# Patient Record
Sex: FEMALE | Race: BLACK OR AFRICAN AMERICAN
[De-identification: names, ages, dates, MRNs, and addresses within clinical notes are randomized per-mention and may not be internally consistent; named-entity substitution may affect disease eponyms.]

---

## 2019-04-08 ENCOUNTER — HOSPITAL ENCOUNTER (EMERGENCY)
Dept: HOSPITAL 62 - ER | Age: 24
Discharge: HOME | End: 2019-04-08
Payer: COMMERCIAL

## 2019-04-08 VITALS — DIASTOLIC BLOOD PRESSURE: 79 MMHG | SYSTOLIC BLOOD PRESSURE: 131 MMHG

## 2019-04-08 DIAGNOSIS — Z91.040: ICD-10-CM

## 2019-04-08 DIAGNOSIS — B96.89: ICD-10-CM

## 2019-04-08 DIAGNOSIS — F17.200: ICD-10-CM

## 2019-04-08 DIAGNOSIS — N76.0: Primary | ICD-10-CM

## 2019-04-08 DIAGNOSIS — Z91.018: ICD-10-CM

## 2019-04-08 DIAGNOSIS — N39.0: ICD-10-CM

## 2019-04-08 LAB
APPEARANCE UR: (no result)
APTT PPP: YELLOW S
BACTERIA (WET MOUNT): (no result)
BILIRUB UR QL STRIP: NEGATIVE
CHLAM PCR: NOT DETECTED
EPITHELIALS (WET MOUNT): (no result)
GLUCOSE UR STRIP-MCNC: NEGATIVE MG/DL
GON PCR: NOT DETECTED
KETONES UR STRIP-MCNC: NEGATIVE MG/DL
NITRITE UR QL STRIP: NEGATIVE
PH UR STRIP: 8 [PH] (ref 5–9)
PROT UR STRIP-MCNC: 30 MG/DL
RBCS (WET MOUNT): (no result)
SP GR UR STRIP: 1.02
T.VAGINALIS (WET MOUNT): (no result)
UROBILINOGEN UR-MCNC: 2 MG/DL (ref ?–2)
WBCS (WET MOUNT): (no result)
YEAST (WET MOUNT): (no result)

## 2019-04-08 PROCEDURE — 99283 EMERGENCY DEPT VISIT LOW MDM: CPT

## 2019-04-08 PROCEDURE — 87210 SMEAR WET MOUNT SALINE/INK: CPT

## 2019-04-08 PROCEDURE — 87088 URINE BACTERIA CULTURE: CPT

## 2019-04-08 PROCEDURE — 87491 CHLMYD TRACH DNA AMP PROBE: CPT

## 2019-04-08 PROCEDURE — 87086 URINE CULTURE/COLONY COUNT: CPT

## 2019-04-08 PROCEDURE — 87186 SC STD MICRODIL/AGAR DIL: CPT

## 2019-04-08 PROCEDURE — 81001 URINALYSIS AUTO W/SCOPE: CPT

## 2019-04-08 PROCEDURE — 87591 N.GONORRHOEAE DNA AMP PROB: CPT

## 2019-04-08 NOTE — ER DOCUMENT REPORT
ED GI/





- General


Chief Complaint: Vaginal Itching


Stated Complaint: VAGINAL ITCH/DISCHARGE


Time Seen by Provider: 04/08/19 10:50


Mode of Arrival: Ambulatory


Information source: Patient


Notes: 





Patient is a 23-year-old female presenting with chief complaint of white itchy 

vaginal discharge.  Patient denies any other symptoms.





TRAVEL OUTSIDE OF THE [place] IN LAST 30 DAYS: No





- Related Data


Allergies/Adverse Reactions: 


                                        





latex Allergy (Verified 04/08/19 10:35)


   


pineapple Allergy (Verified 04/08/19 10:35)


   











Past Medical History





- General


Information source: Patient





- Social History


Smoking Status: Current Some Day Smoker


Chew tobacco use (# tins/day): No


Frequency of alcohol use: None


Drug Abuse: None


Family History: Reviewed & Not Pertinent


Patient has suicidal ideation: No


Patient has homicidal ideation: No





- Medical History


Medical History: Negative


Renal/ Medical History: Denies: Hx Peritoneal Dialysis


Surgical Hx: Negative





- Immunizations


Immunizations up to date: Yes





Review of Systems





- Review of Systems


Constitutional: No symptoms reported


EENT: No symptoms reported


Cardiovascular: No symptoms reported


Respiratory: No symptoms reported


Gastrointestinal: No symptoms reported


Genitourinary: No symptoms reported


Female Genitourinary: Vaginal discharge


Musculoskeletal: No symptoms reported


Skin: No symptoms reported


Hematologic/Lymphatic: No symptoms reported


Neurological/Psychological: No symptoms reported





Physical Exam





- Vital signs


Vitals: 


                                        











Temp Pulse Resp BP Pulse Ox


 


 98.4 F   79   16   128/69 H  100 


 


 04/08/19 10:39  04/08/19 10:39  04/08/19 10:39  04/08/19 10:39  04/08/19 10:39














- Notes


Notes: 





PHYSICAL EXAMINATION:





GENERAL: Well-appearing, well-nourished and in no acute distress.





HEAD: Atraumatic, normocephalic.





EYES: Pupils equal round and reactive to light, extraocular movements intact, 

conjunctiva are normal.





ENT: Nares patent, oropharynx clear without exudates.  Moist mucous membranes.





NECK: Normal range of motion, supple without lymphadenopathy





LUNGS: Breath sounds clear to auscultation bilaterally and equal.  No wheezes 

rales or rhonchi.





HEART: Regular rate and rhythm without murmurs





ABDOMEN: Soft, nontender, nondistended abdomen.  No guarding, no rebound.  No 

masses appreciated.





Female : deferred





Musculoskeletal: Normal range of motion, no pitting or edema.  No cyanosis.





NEUROLOGICAL: Cranial nerves grossly intact.  Normal speech, normal gait.  

Normal sensory, motor exams





PSYCH: Normal mood, normal affect.





SKIN: Warm, Dry, normal turgor, no rashes or lesions noted.





Course





- Re-evaluation


Re-evalutation: 





Patient with 3+ bacteria on wet mount.  Urinalysis also shows evidence of 

infection.  This was discussed with the patient.  Will start patient on 

antibiotics for both bacterial vaginosis as well as urinary tract infection.  

Culture pending.








- Vital Signs


Vital signs: 


                                        











Temp Pulse Resp BP Pulse Ox


 


 98.1 F   68   16   131/79 H  100 


 


 04/08/19 11:55  04/08/19 11:55  04/08/19 11:55  04/08/19 11:55  04/08/19 11:55














- Laboratory


Laboratory results interpreted by me: 


                                        











  04/08/19





  11:12


 


Urine Protein  30 H


 


Urine Blood  SMALL H


 


Urine Urobilinogen  2.0 H


 


Ur Leukocyte Esterase  LARGE H














Discharge





- Discharge


Clinical Impression: 


 Bacterial vaginosis





Urinary tract infection


Qualifiers:


 Urinary tract infection type: site unspecified Hematuria presence: without 

hematuria Qualified Code(s): N39.0 - Urinary tract infection, site not specified





Condition: Stable


Disposition: HOME, SELF-CARE


Additional Instructions: 


Vaginosis, Bacterial





     Your exam shows you have bacterial vaginosis. This condition is due to an 

overgrowth of bacteria in the vagina. Symptoms may include vaginal itching or 

pain, a smelly discharge, and sometimes burning with urination. Normally this is

not transmitted by sexual contact.


     Vaginosis can be treated with oral or topical antibiotics. Metronidazole 

(Flagyl) pills are usually effective. Topical vaginal creams include Cleocin and

Metro-Gel. You should avoid sexual contact until your symptoms are all better.


     Call the doctor if you develop pelvic pain, fever, or problems with 

urination, or if you don't improve as expected.





URINARY TRACT INFECTION:





     Your evaluation indicates that you have a urinary tract infection. This is 

due to germs growing in the bladder.  This is a common problem.


     This infection usually responds quickly to antibiotics.  Your antibiotic 

should be taken exactly as prescribed.  Drink plenty of fluids -- three to four 

quarts a day.


     Occasionally, a bladder anesthetic will be prescribed to help stop the 

feeling of urgency until the antibiotic has a chance to clear the infection.  

This may cause your urine to be dark orange.


     Certain urine infections require a culture.  If the doctor obtained a 

culture, the results will be back in two days.  You should call to see if a 

change in treatment is needed.


     A repeat urinalysis after you finish treatment is often recommended.  The 

physician will let you know if further testing is required.


     Call the doctor if you develop fever, chills, flank pain, inability to 

urinate, or blood in the urine.








ANTIBIOTIC THERAPY:


     You have been given an antibiotic prescription.  It's important that you 

take all the medication, unless instructed otherwise by your physician.  Failure

to complete the entire course can result in relapse of your condition.


     Common side effects of antibiotics include nausea, intestinal cramping, or 

diarrhea.  Women may develop vaginal yeast infections, and babies can get yeast 

(thrush) in the mouth following the use of antibiotics.  Contact your physician 

if you develop significant side effects from this medication.


     Allergy to this antibiotic can result in hives, wheezing, faintness, or 

itching.  If symptoms of allergy occur, stop the medication and call the doctor.








CEPHALEXIN:


     The antibiotic you've been prescribed is a member of the cephalosporin 

class.  This type of antibiotic covers a wide variety of infections, including 

those of the skin, lungs, and urinary tract. It's useful for staph infections.


     This antibiotic is slightly similar to the penicillin family. In rare 

cases, a person who is allergic to penicillin will also be allergic to this 

medication.  If you have had a severe allergic reaction to penicillin, and have 

not taken this antibiotic since that time, notify your doctor.


     Antibiotics which cover many germs ("broad spectrum" antibiotics) are more 

likely to cause diarrhea or "yeast" infections.  Women prone to vaginal yeast 

problems may suffer an attack after taking this antibiotic.  In infants, oral 

thrush (white spots "stuck" on the cheek) or yeast diaper rash may result.  See 

your doctor if these problems occur.  Call at once if you develop itching, 

hives, shortness of breath, or lightheadedness.








Metronidazole





 Metronidazole (Flagyl) has been prescribed.  This medication is used to kill a 

type of bacteria called anaerobes, and protozoan parasites such as trichomonas 

and Giardia.


     Flagyl often causes a metallic taste in the mouth and mild nausea.


     Do not use alcohol in any form with Flagyl (including alcohol in medication

elixirs).  Flagyl interacts with alcohol to cause flushing, palpitations, 

headache, stomach cramps, and vomiting.  Do not use Flagyl if you are taking 

Antabuse (disulfiram).


     Call the doctor at once if you develop rash, shortness of breath, itching, 

or lightheadedness.





FOLLOW-UP CARE:


If you have been referred to a physician for follow-up care, call the 

physicians office for an appointment as you were instructed or within the next 

two days.  If you experience worsening or a significant change in your symptoms,

notify the physician immediately or return to the Emergency Department at any 

time for re-evaluation.














Prescriptions: 


Cephalexin [Keflex] 500 mg PO BID #14 capsule


Metronidazole [Flagyl 500 mg Tablet] 500 mg PO BID #28 tablet

## 2019-04-08 NOTE — ER DOCUMENT REPORT
ED Medical Screen (RME)





- General


Chief Complaint: Vaginal Itching


Stated Complaint: VAGINAL ITCH/DISCHARGE


Time Seen by Provider: 04/08/19 10:50


Mode of Arrival: Ambulatory


Information source: Patient


Notes: 





Patient is a 23-year-old female with complaints of white itchy vaginal discharge

that is been present for approximately 2 weeks.  Patient denies any abnormal 

odor.  Denies any other symptoms to include nausea, vomiting or abdominal pain. 

Patient also denies any dysuria or urinary frequency.





Exam:


Patient alert, oriented with no acute distress noted.


Abdomen soft, nontender.





I have greeted and performed a rapid initial assessment of this patient.  A 

comprehensive ED assessment and evaluation of the patient, analysis of test 

results and completion of the medical decision making process will be conducted 

by additional ED providers.  Dictation of this chart was performed using voice 

recognition software; therefore, there may be some unintended grammatical 

errors.


TRAVEL OUTSIDE OF THE [place] IN LAST 30 DAYS: No





- Related Data


Allergies/Adverse Reactions: 


                                        





latex Allergy (Verified 04/08/19 10:35)


   


pineapple Allergy (Verified 04/08/19 10:35)


   











Past Medical History





- Social History


Chew tobacco use (# tins/day): No


Frequency of alcohol use: None


Drug Abuse: None


Renal/ Medical History: Denies: Hx Peritoneal Dialysis





Physical Exam





- Vital signs


Vitals: 





                                        











Temp Pulse Resp BP Pulse Ox


 


 98.4 F   79   16   128/69 H  100 


 


 04/08/19 10:39  04/08/19 10:39  04/08/19 10:39  04/08/19 10:39  04/08/19 10:39














Course





- Vital Signs


Vital signs: 





                                        











Temp Pulse Resp BP Pulse Ox


 


 98.4 F   79   16   128/69 H  100 


 


 04/08/19 10:39  04/08/19 10:39  04/08/19 10:39  04/08/19 10:39  04/08/19 10:39